# Patient Record
Sex: FEMALE | Race: WHITE | NOT HISPANIC OR LATINO | Employment: PART TIME | URBAN - METROPOLITAN AREA
[De-identification: names, ages, dates, MRNs, and addresses within clinical notes are randomized per-mention and may not be internally consistent; named-entity substitution may affect disease eponyms.]

---

## 2017-01-26 ENCOUNTER — ALLSCRIPTS OFFICE VISIT (OUTPATIENT)
Dept: OTHER | Facility: OTHER | Age: 27
End: 2017-01-26

## 2017-03-17 ENCOUNTER — ALLSCRIPTS OFFICE VISIT (OUTPATIENT)
Dept: OTHER | Facility: OTHER | Age: 27
End: 2017-03-17

## 2017-08-11 ENCOUNTER — HOSPITAL ENCOUNTER (OUTPATIENT)
Dept: RADIOLOGY | Facility: HOSPITAL | Age: 27
Discharge: HOME | End: 2017-08-11
Payer: COMMERCIAL

## 2017-08-11 ENCOUNTER — ALLSCRIPTS OFFICE VISIT (OUTPATIENT)
Dept: OTHER | Facility: OTHER | Age: 27
End: 2017-08-11

## 2017-08-11 ENCOUNTER — TRANSCRIBE ORDERS (OUTPATIENT)
Dept: ADMINISTRATIVE | Facility: HOSPITAL | Age: 27
End: 2017-08-11

## 2017-08-11 DIAGNOSIS — R07.89 OTHER CHEST PAIN: ICD-10-CM

## 2017-08-11 PROCEDURE — 71020 HB CHEST X-RAY 2VW FRONTAL&LATL: CPT

## 2017-10-23 ENCOUNTER — ALLSCRIPTS OFFICE VISIT (OUTPATIENT)
Dept: OTHER | Facility: OTHER | Age: 27
End: 2017-10-23

## 2017-10-24 NOTE — PROGRESS NOTES
Assessment  1  Cough due to bronchospasm (519 11) (J98 01)   2  Bacterial sinusitis (473 9,041 9) (J32 9,B96 89)    Plan  Cough due to bronchospasm    · ProAir  (90 Base) MCG/ACT Inhalation Aerosol Solution; INHALE 2 PUFFS  EVERY 4 HOURS AS NEEDED    Discussion/Summary    Patient to start Amoxicillin and ProAir inhaler for chest tightness  RTO as needed  Possible side effects of new medications were reviewed with the patient/guardian today  The treatment plan was reviewed with the patient/guardian  The patient/guardian understands and agrees with the treatment plan      Chief Complaint  Cough and congestion  History of Present Illness  HPI: 32year old white female c/o cough and congestion that started last week, Wednesday night  Has no runny nose, but post nasal drip, and nausea  Review of Systems    Constitutional: fever,-- chills-- and-- feeling tired  ENT: earache-- and-- sore throat, but-- as noted in HPI  Respiratory: shortness of breath-- and-- cough  Gastrointestinal: nausea, but-- no abdominal pain-- and-- no vomiting  Active Problems  1  Allergic rhinitis, unspecified (477 9) (J30 9)   2  Cough due to bronchospasm (519 11) (J98 01)   3  Fatigue (780 79) (R53 83)   4  GERD without esophagitis (530 81) (K21 9)   5  Moderate episode of recurrent major depressive disorder (296 32) (F33 1)    Social History   · Dental care, regularly   · Employed   · Exercises rarely (V49 89) (Z78 9)   · Lives with significant other   · Living Situation: Supportive and safe   · Minimum alcohol consumption   · Never smoker   · No drug use   · Well-balanced diet    Current Meds   1  BuPROPion HCl ER (SR) 100 MG Oral Tablet Extended Release 12 Hour; TAKE 1   TABLET Other in the morning for 7 days then 1 pill twice daily; Therapy: [id number] to (Evaluate:18Oct2017)  Requested for: 20Twg7992; Last   Rx:[id number] Ordered   2  RaNITidine HCl - 150 MG Oral Tablet; TAKE 1 TABLET DAILY AS NEEDED;    Therapy: [id number] to (Evaluate:22Jan2017); Last Rx:[id number] Ordered   3  ZyrTEC Allergy 10 MG Oral Capsule; qd;   Therapy: 03Kqj2281 to (Last Rx:[id number]) Ordered    The medication list was reviewed and updated today  Allergies  1  Cephalosporins   2  Reglan    Vitals   Recorded: [id number] 02:35PM   Temperature 96 9 F, Tympanic   Systolic 385, LUE, Sitting   Diastolic 70, LUE, Sitting   Weight 118 lb    BMI Calculated 20 25   BSA Calculated 1 56     Physical Exam    Constitutional   General appearance: No acute distress, well appearing and well nourished  Eyes   Conjunctiva and lids: No swelling, erythema or discharge  Pupils and irises: Equal, round and reactive to light  Ears, Nose, Mouth, and Throat   External inspection of ears and nose: Normal     Otoscopic examination: Tympanic membranes translucent with normal light reflex  Canals patent without erythema  Nasal mucosa, septum, and turbinates: Abnormal  -- Turbinates inflamed, septum deviated  Oropharynx: Normal with no erythema, edema, exudate or lesions  Pulmonary   Respiratory effort: No increased work of breathing or signs of respiratory distress  Auscultation of lungs: Clear to auscultation           Signatures   Electronically signed by : Jh Garza [facility]; Oct 23 2017  2:44PM EST                       (Author)    Electronically signed by : Zachary Wilks DO; Oct 23 2017  5:05PM EST                       (Author)

## 2018-01-13 VITALS
HEIGHT: 64 IN | DIASTOLIC BLOOD PRESSURE: 80 MMHG | SYSTOLIC BLOOD PRESSURE: 120 MMHG | RESPIRATION RATE: 70 BRPM | TEMPERATURE: 98.1 F | BODY MASS INDEX: 21.22 KG/M2 | WEIGHT: 124.31 LBS

## 2018-01-13 VITALS
TEMPERATURE: 98.5 F | WEIGHT: 124.31 LBS | DIASTOLIC BLOOD PRESSURE: 70 MMHG | SYSTOLIC BLOOD PRESSURE: 118 MMHG | HEART RATE: 76 BPM | HEIGHT: 64 IN | BODY MASS INDEX: 21.22 KG/M2

## 2018-01-14 VITALS
BODY MASS INDEX: 20.25 KG/M2 | DIASTOLIC BLOOD PRESSURE: 70 MMHG | SYSTOLIC BLOOD PRESSURE: 110 MMHG | WEIGHT: 118 LBS | TEMPERATURE: 96.9 F

## 2018-06-06 ENCOUNTER — OFFICE VISIT (OUTPATIENT)
Dept: FAMILY MEDICINE CLINIC | Facility: HOSPITAL | Age: 28
End: 2018-06-06

## 2018-06-06 VITALS
TEMPERATURE: 96.3 F | BODY MASS INDEX: 19.99 KG/M2 | OXYGEN SATURATION: 99 % | HEART RATE: 79 BPM | DIASTOLIC BLOOD PRESSURE: 70 MMHG | SYSTOLIC BLOOD PRESSURE: 112 MMHG | WEIGHT: 112.8 LBS | HEIGHT: 63 IN

## 2018-06-06 DIAGNOSIS — J32.9 SINUSITIS, UNSPECIFIED CHRONICITY, UNSPECIFIED LOCATION: Primary | ICD-10-CM

## 2018-06-06 PROBLEM — F33.1 MODERATE EPISODE OF RECURRENT MAJOR DEPRESSIVE DISORDER (HCC): Status: ACTIVE | Noted: 2017-01-26

## 2018-06-06 PROBLEM — R53.83 FATIGUE: Status: ACTIVE | Noted: 2017-08-11

## 2018-06-06 PROBLEM — J30.9 ALLERGIC RHINITIS: Status: ACTIVE | Noted: 2017-08-11

## 2018-06-06 PROBLEM — Q51.28 SEPTATE UTERUS: Status: ACTIVE | Noted: 2017-12-07

## 2018-06-06 PROCEDURE — 99212 OFFICE O/P EST SF 10 MIN: CPT | Performed by: PHYSICIAN ASSISTANT

## 2018-06-06 RX ORDER — ALBUTEROL SULFATE 90 UG/1
1 AEROSOL, METERED RESPIRATORY (INHALATION) EVERY 6 HOURS
COMMUNITY
End: 2022-06-24 | Stop reason: ALTCHOICE

## 2018-06-06 RX ORDER — CETIRIZINE HYDROCHLORIDE 10 MG/1
10 TABLET ORAL
COMMUNITY

## 2018-06-06 RX ORDER — AZITHROMYCIN 250 MG/1
250 TABLET, FILM COATED ORAL EVERY 24 HOURS
Qty: 6 TABLET | Refills: 0 | Status: SHIPPED | OUTPATIENT
Start: 2018-06-06 | End: 2018-06-11

## 2018-06-06 RX ORDER — NORGESTIMATE AND ETHINYL ESTRADIOL
1 KIT DAILY
Refills: 4 | COMMUNITY
Start: 2018-04-09 | End: 2022-06-24 | Stop reason: ALTCHOICE

## 2018-06-06 RX ORDER — RANITIDINE 150 MG/1
1 TABLET ORAL DAILY PRN
COMMUNITY
Start: 2016-12-23 | End: 2022-06-24 | Stop reason: ALTCHOICE

## 2018-06-06 NOTE — PROGRESS NOTES
Assessment/Plan:           Diagnoses and all orders for this visit:    Sinusitis, unspecified chronicity, unspecified location  -     azithromycin (ZITHROMAX) 250 mg tablet; Take 1 tablet (250 mg total) by mouth every 24 hours for 5 days Take 2 tabs  Day one, then one tab  Daily until completed  Other orders  -     albuterol (PROVENTIL HFA,VENTOLIN HFA) 90 mcg/act inhaler; Inhale 1 puff every 6 (six) hours  -     cetirizine (ZyrTEC) 10 mg tablet; Take 10 mg by mouth  -     TRI-LO-TITUS 0 18/0 215/0 25 MG-25 MCG per tablet; Take 1 tablet by mouth daily  -     IRON PO; Take 1 tablet by mouth  -     ranitidine (ZANTAC) 150 mg tablet; Take 1 tablet by mouth daily as needed        Subjective:      Patient ID: Jones Kramer is a 32 y o  female  32year old white female presents with c/o sinus infection sx  Past one week  Was on flight from [place], with someone sick next to patient  Has congestion, green mucous  Coughing productive of green phlegm, sneezing, and having chills  Very tired  Took Mucinex with no relief  Has shortness of breath  Sinus Problem   Associated symptoms include chills, congestion, coughing, diaphoresis, ear pain, headaches, shortness of breath and a sore throat  Review of Systems   Constitutional: Positive for chills, diaphoresis and fatigue  HENT: Positive for congestion, ear pain, rhinorrhea and sore throat  Respiratory: Positive for cough, chest tightness and shortness of breath  Neurological: Positive for dizziness and headaches  Objective:      /70 (BP Location: Left arm, Patient Position: Sitting, Cuff Size: Standard)   Pulse 79   Temp (!) 96 3 °F (35 7 °C)   Ht 5' 3" (1 6 m)   Wt 51 2 kg (112 lb 12 8 oz)   SpO2 99%   BMI 19 98 kg/m²          Physical Exam   Constitutional: She is oriented to person, place, and time  She appears well-developed and well-nourished  No distress  Slim build      HENT:   Head: Normocephalic and atraumatic  Right Ear: External ear normal    Left Ear: External ear normal    Nose: Nose normal    Mouth/Throat: No oropharyngeal exudate  Pulmonary/Chest: Effort normal and breath sounds normal  No respiratory distress  She has no wheezes  She has no rales  Neurological: She is alert and oriented to person, place, and time  Skin: She is not diaphoretic  Nursing note and vitals reviewed

## 2018-06-06 NOTE — PATIENT INSTRUCTIONS
Patient to start Zithromax, given Rx  And coupon for pharmacy  Also to increase water intake, and can try otc decongestant

## 2019-03-06 ENCOUNTER — OFFICE VISIT (OUTPATIENT)
Dept: URGENT CARE | Facility: CLINIC | Age: 29
End: 2019-03-06
Payer: COMMERCIAL

## 2019-03-06 VITALS
HEART RATE: 82 BPM | OXYGEN SATURATION: 96 % | SYSTOLIC BLOOD PRESSURE: 118 MMHG | RESPIRATION RATE: 16 BRPM | BODY MASS INDEX: 21.97 KG/M2 | WEIGHT: 124 LBS | TEMPERATURE: 99 F | HEIGHT: 63 IN | DIASTOLIC BLOOD PRESSURE: 76 MMHG

## 2019-03-06 DIAGNOSIS — L50.8 URTICARIA, ACUTE: Primary | ICD-10-CM

## 2019-03-06 PROCEDURE — G0382 LEV 3 HOSP TYPE B ED VISIT: HCPCS | Performed by: FAMILY MEDICINE

## 2019-03-06 RX ORDER — METHYLPREDNISOLONE SODIUM SUCCINATE 125 MG/2ML
125 INJECTION, POWDER, LYOPHILIZED, FOR SOLUTION INTRAMUSCULAR; INTRAVENOUS ONCE
Status: COMPLETED | OUTPATIENT
Start: 2019-03-06 | End: 2019-03-06

## 2019-03-06 RX ORDER — PREDNISONE 10 MG/1
TABLET ORAL
Qty: 10 TABLET | Refills: 0 | Status: SHIPPED | OUTPATIENT
Start: 2019-03-07 | End: 2019-03-11

## 2019-03-06 RX ORDER — NORGESTIMATE AND ETHINYL ESTRADIOL 7DAYSX3 28
KIT ORAL
COMMUNITY
Start: 2019-02-11 | End: 2022-06-24 | Stop reason: ALTCHOICE

## 2019-03-06 RX ADMIN — METHYLPREDNISOLONE SODIUM SUCCINATE 125 MG: 125 INJECTION, POWDER, LYOPHILIZED, FOR SOLUTION INTRAMUSCULAR; INTRAVENOUS at 10:24

## 2019-03-06 NOTE — PATIENT INSTRUCTIONS
Solu-Medrol 125 mg given IM in care now  Prednisone taper start tomorrow  Follow-up with PCP in 4-5 days if symptoms not improving, sooner if symptoms acutely worsen

## 2019-03-06 NOTE — PROGRESS NOTES
[facility]        NAME: Joesph Joya is a 29 y o  female  : 1990    MRN: [id number]  DATE: 2019  TIME: 11:22 AM    Assessment and Plan   Urticaria, acute [L50 8]  1  Urticaria, acute  methylPREDNISolone sodium succinate (Solu-MEDROL) injection 125 mg    predniSONE 10 mg tablet         Patient Instructions   Patient Instructions   Solu-Medrol 125 mg given IM in care now  Prednisone taper start tomorrow  Follow-up with PCP in 4-5 days if symptoms not improving, sooner if symptoms acutely worsen        Proceed to  ER if symptoms worsen  Chief Complaint     Chief Complaint   Patient presents with    Rash     Pt reports an itchy rash on her hands/arms/feet that began yesterday  History of Present Illness       Patient presents with complaint of itchy rash on her arms legs feet and the worse on her hands and fingers  She states she had a viral sinus infection for the past week or so which has improved  She did take Benadryl this morning without much improvement  She denies fevers or chills, no headache no shortness of breath or wheezing, mild chest tightness from her resolving illness  No symptoms made worse by the development of this rash  No joint pain no skin exfoliation      Review of Systems   Review of Systems   Constitutional: Negative  HENT: Positive for congestion  Respiratory: Positive for chest tightness  Cardiovascular: Negative  Skin: Positive for rash           Current Medications       Current Outpatient Medications:     cetirizine (ZyrTEC) 10 mg tablet, Take 10 mg by mouth, Disp: , Rfl:     norgestimate-ethinyl estradiol (ORTHO TRI-CYCLEN,TRINESSA) 0 18/0 215/0 25 MG-35 MCG per tablet, , Disp: , Rfl:     ranitidine (ZANTAC) 150 mg tablet, Take 1 tablet by mouth daily as needed, Disp: , Rfl:     albuterol (PROVENTIL HFA,VENTOLIN HFA) 90 mcg/act inhaler, Inhale 1 puff every 6 (six) hours, Disp: , Rfl:     IRON PO, Take 1 tablet by mouth, Disp: , Rfl:     [START ON 3/7/2019] predniSONE 10 mg tablet, Take 4 tablets starting tomorrow then 3 tablets the next day, 2 tablets the next day, 1 tablet the next day, Disp: 10 tablet, Rfl: 0    TRI-LO-TITUS 0 18/0 215/0 25 MG-25 MCG per tablet, Take 1 tablet by mouth daily, Disp: , Rfl: 4  No current facility-administered medications for this visit  Current Allergies     Allergies as of 03/06/2019 - Reviewed 03/06/2019   Allergen Reaction Noted    Hydrocodone-acetaminophen Other (See Comments)     Lactose Diarrhea 01/08/2016    Metoclopramide Other (See Comments) 02/16/2015    Other GI Intolerance 02/29/2016    Oxycodone-acetaminophen      Pollen extract  12/18/2015    Cephalexin Rash 08/09/2016    Cephalosporins Rash 07/25/2016            The following portions of the patient's history were reviewed and updated as appropriate: allergies, current medications, past family history, past medical history, past social history, past surgical history and problem list      Past Medical History:   Diagnosis Date    Migraines     Miscarriage     POTS (postural orthostatic tachycardia syndrome)        History reviewed  No pertinent surgical history  History reviewed  No pertinent family history  Medications have been verified  Objective   /76   Pulse 82   Temp 99 °F (37 2 °C)   Resp 16   Ht 5' 3" (1 6 m)   Wt 56 2 kg (124 lb)   LMP 02/13/2019   SpO2 96%   BMI 21 97 kg/m²        Physical Exam     Physical Exam   Constitutional: She appears well-developed and well-nourished  HENT:   Right Ear: External ear normal    Left Ear: External ear normal    Mouth/Throat: Oropharynx is clear and moist  No oropharyngeal exudate  Mild intranasal mucosal erythema and rhinorrhea   Eyes: Pupils are equal, round, and reactive to light  Conjunctivae are normal    Neck: Neck supple  Cardiovascular: Normal rate, regular rhythm and normal heart sounds     Pulmonary/Chest: Effort normal and breath sounds normal  No respiratory distress  She has no wheezes  Lymphadenopathy:     She has no cervical adenopathy     Skin:   Few areas of macules on the upper extremities, bilateral hands with diffuse erythema and warmth extending from the metacarpal heads to the fingers easily blanchable

## 2019-11-25 ENCOUNTER — OFFICE VISIT (OUTPATIENT)
Dept: URGENT CARE | Facility: CLINIC | Age: 29
End: 2019-11-25
Payer: COMMERCIAL

## 2019-11-25 VITALS
BODY MASS INDEX: 23.21 KG/M2 | SYSTOLIC BLOOD PRESSURE: 140 MMHG | RESPIRATION RATE: 18 BRPM | TEMPERATURE: 99.2 F | OXYGEN SATURATION: 99 % | HEIGHT: 63 IN | WEIGHT: 131 LBS | HEART RATE: 97 BPM | DIASTOLIC BLOOD PRESSURE: 90 MMHG

## 2019-11-25 DIAGNOSIS — R06.00 DYSPNEA, UNSPECIFIED TYPE: ICD-10-CM

## 2019-11-25 DIAGNOSIS — J02.9 ACUTE PHARYNGITIS, UNSPECIFIED ETIOLOGY: Primary | ICD-10-CM

## 2019-11-25 LAB — S PYO AG THROAT QL: NEGATIVE

## 2019-11-25 PROCEDURE — 87880 STREP A ASSAY W/OPTIC: CPT | Performed by: FAMILY MEDICINE

## 2019-11-25 PROCEDURE — 99213 OFFICE O/P EST LOW 20 MIN: CPT | Performed by: FAMILY MEDICINE

## 2019-11-25 NOTE — PROGRESS NOTES
NAME: Ciro Lefort is a 34 y o  female  : 1990    MRN: [id number]      Assessment and Plan   Acute pharyngitis, unspecified etiology [J02 9]  1  Acute pharyngitis, unspecified etiology  POCT rapid strepA   2  Dyspnea, unspecified type             Patient Instructions   Patient Instructions   F/u as needed  Go to ER if worse symptoms  Only mild symptoms  Albuterol as needed  Rapid strep neg    Proceed to ER if symptoms worsen  Chief Complaint     Chief Complaint   Patient presents with    Sinusitis     Pt states she has had sinus pressure with post nasal drip x 3 days  Pt is 4 weeks pregnant  She states she is SOB at rest  Pulse ox 99%  History of Present Illness   Here c/o sinus congestion  Stuffy, runny nose  Postnasal drip  Sinus pressure for 3 days  Feeling SOB sitting  No hx of DVT or calf pain  No recent surgeries  No recent illness in bed for weeks  She is 4 wks pregnant  She did not use an inhaler- out of this  Maybe some tightness in chest at times      Review of Systems   Review of Systems   Constitutional: Positive for fatigue  Negative for fever  HENT: Positive for sinus pressure and sore throat  Negative for ear pain and trouble swallowing  Eyes: Negative for visual disturbance  Respiratory: Positive for cough and shortness of breath  Negative for chest tightness  Cardiovascular: Negative for chest pain  Gastrointestinal: Positive for nausea  Negative for abdominal pain, diarrhea and vomiting  Genitourinary: Negative for difficulty urinating and dysuria  Skin: Negative for rash and wound  Neurological: Positive for headaches  Negative for weakness  Psychiatric/Behavioral: Negative for behavioral problems and confusion           Current Medications       Current Outpatient Medications:     albuterol (PROVENTIL HFA,VENTOLIN HFA) 90 mcg/act inhaler, Inhale 1 puff every 6 (six) hours, Disp: , Rfl:     cetirizine (ZyrTEC) 10 mg tablet, Take 10 mg by mouth, Disp: , Rfl:     IRON PO, Take 1 tablet by mouth, Disp: , Rfl:     norgestimate-ethinyl estradiol (ORTHO TRI-CYCLEN,TRINESSA) 0 18/0 215/0 25 MG-35 MCG per tablet, , Disp: , Rfl:     ranitidine (ZANTAC) 150 mg tablet, Take 1 tablet by mouth daily as needed, Disp: , Rfl:     TRI-LO-TITUS 0 18/0 215/0 25 MG-25 MCG per tablet, Take 1 tablet by mouth daily, Disp: , Rfl: 4    Current Allergies     Allergies as of 11/25/2019 - Reviewed 11/25/2019   Allergen Reaction Noted    Hydrocodone-acetaminophen Other (See Comments)     Lactose Diarrhea 01/08/2016    Metoclopramide Other (See Comments) 02/16/2015    Other GI Intolerance 02/29/2016    Oxycodone-acetaminophen      Pollen extract Itching 12/18/2015    Tramadol Other (See Comments) 10/15/2018    Cephalexin Rash 08/09/2016    Cephalosporins Rash 07/25/2016              Past Medical History:   Diagnosis Date    Migraines     Miscarriage     POTS (postural orthostatic tachycardia syndrome)        History reviewed  No pertinent surgical history  History reviewed  No pertinent family history  Medications have been verified  The following portions of the patient's history were reviewed and updated as appropriate: allergies, current medications, past family history, past medical history, past social history, past surgical history and problem list     Objective   /90   Pulse 97   Temp 99 2 °F (37 3 °C)   Resp 18   Ht 5' 3" (1 6 m)   Wt 59 4 kg (131 lb)   SpO2 99%   BMI 23 21 kg/m²      Physical Exam     Physical Exam   Constitutional: She is oriented to person, place, and time  She appears well-developed and well-nourished  HENT:   Head: Normocephalic  Eyes: EOM are normal    Neck: Normal range of motion  Cardiovascular: Normal rate, regular rhythm and normal heart sounds  Exam reveals no gallop and no friction rub  No murmur heard  Pulmonary/Chest: Effort normal and breath sounds normal  No respiratory distress   She has no wheezes  She has no rales  Abdominal: Soft  Bowel sounds are normal  She exhibits no distension  There is no tenderness  There is no rebound and no guarding  Musculoskeletal: Normal range of motion  Neurological: She is oriented to person, place, and time  Skin: Skin is warm and dry  No rash noted  Psychiatric: She has a normal mood and affect  Thought content normal    Nursing note and vitals reviewed

## 2019-11-25 NOTE — PATIENT INSTRUCTIONS
F/u as needed  Go to ER if worse symptoms  Only mild symptoms  Albuterol as needed    Rapid strep neg

## 2019-12-23 ENCOUNTER — OFFICE VISIT (OUTPATIENT)
Dept: URGENT CARE | Facility: CLINIC | Age: 29
End: 2019-12-23
Payer: COMMERCIAL

## 2019-12-23 VITALS
OXYGEN SATURATION: 98 % | DIASTOLIC BLOOD PRESSURE: 69 MMHG | SYSTOLIC BLOOD PRESSURE: 131 MMHG | HEART RATE: 92 BPM | RESPIRATION RATE: 20 BRPM | BODY MASS INDEX: 21.34 KG/M2 | WEIGHT: 125 LBS | TEMPERATURE: 99.9 F | HEIGHT: 64 IN

## 2019-12-23 DIAGNOSIS — H10.9 BACTERIAL CONJUNCTIVITIS OF RIGHT EYE: Primary | ICD-10-CM

## 2019-12-23 PROCEDURE — 99213 OFFICE O/P EST LOW 20 MIN: CPT | Performed by: FAMILY MEDICINE

## 2019-12-23 RX ORDER — SULFACETAMIDE SODIUM 100 MG/ML
1 SOLUTION/ DROPS OPHTHALMIC
Qty: 5 ML | Refills: 0 | Status: SHIPPED | OUTPATIENT
Start: 2019-12-23 | End: 2019-12-30

## 2019-12-23 NOTE — PATIENT INSTRUCTIONS
F/u here as needed  Stop wearing contacts for 1 wk  Begin abx eye drops  F/u with Eye dr if no improvement

## 2019-12-23 NOTE — PROGRESS NOTES
NAME: Kannan Snyder is a 34 y o  female  : 1990    MRN: [id number]      Assessment and Plan   Bacterial conjunctivitis of right eye [H10 9]  1  Bacterial conjunctivitis of right eye  sulfacetamide (BLEPH-10) 10 % ophthalmic solution           Patient Instructions   Patient Instructions   F/u here as needed  Stop wearing contacts for 1 wk  Begin abx eye drops  F/u with Eye dr if no improvement  Proceed to ER if symptoms worsen  Chief Complaint     Chief Complaint   Patient presents with    Eye Redness     patient reports today started with right eye redness, itching  and burning along with irritation  reports she is 8 weeks pregnant  History of Present Illness   Here c/o R eye redness  No change in vision  Started today  Wears contacts  Minor pain,itches, burns  Took out contacts      Review of Systems   Review of Systems   Constitutional: Negative for fatigue and fever  HENT: Negative for ear pain and trouble swallowing  Eyes: Positive for pain, discharge and redness  Negative for visual disturbance  Respiratory: Negative for chest tightness and shortness of breath  Cardiovascular: Negative for chest pain  Gastrointestinal: Negative for abdominal pain, diarrhea, nausea and vomiting  Genitourinary: Negative for difficulty urinating and dysuria  Skin: Negative for rash and wound  Neurological: Negative for weakness and headaches           Current Medications       Current Outpatient Medications:     cetirizine (ZyrTEC) 10 mg tablet, Take 10 mg by mouth, Disp: , Rfl:     doxylamine (UNISON) 25 MG tablet, Take 25 mg by mouth daily as needed, Disp: , Rfl:     IRON PO, Take 1 tablet by mouth, Disp: , Rfl:     Prenatal Multivit-Min-Fe-FA (PRENATAL 1 + IRON PO), Take 1 tablet by mouth daily, Disp: , Rfl:     albuterol (PROVENTIL HFA,VENTOLIN HFA) 90 mcg/act inhaler, Inhale 1 puff every 6 (six) hours, Disp: , Rfl:     norgestimate-ethinyl estradiol (ORTHO TRI-CYCLEN,TRINESSA) 0 18/0 215/0 25 MG-35 MCG per tablet, , Disp: , Rfl:     ranitidine (ZANTAC) 150 mg tablet, Take 1 tablet by mouth daily as needed, Disp: , Rfl:     sulfacetamide (BLEPH-10) 10 % ophthalmic solution, Administer 1 drop to the right eye every 3 (three) hours for 7 days, Disp: 5 mL, Rfl: 0    TRI-LO-TITUS 0 18/0 215/0 25 MG-25 MCG per tablet, Take 1 tablet by mouth daily, Disp: , Rfl: 4    Current Allergies     Allergies as of 12/23/2019 - Reviewed 12/23/2019   Allergen Reaction Noted    Hydrocodone-acetaminophen Other (See Comments)     Lactose Diarrhea 01/08/2016    Metoclopramide Other (See Comments) 02/16/2015    Other GI Intolerance 02/29/2016    Oxycodone-acetaminophen      Pollen extract Itching 12/18/2015    Tramadol Other (See Comments) 10/15/2018    Cephalexin Rash 08/09/2016    Cephalosporins Rash 07/25/2016              Past Medical History:   Diagnosis Date    Migraines     Miscarriage     POTS (postural orthostatic tachycardia syndrome)        History reviewed  No pertinent surgical history  History reviewed  No pertinent family history  Medications have been verified  The following portions of the patient's history were reviewed and updated as appropriate: allergies, current medications, past family history, past medical history, past social history, past surgical history and problem list     Objective   /69   Pulse 92   Temp 99 9 °F (37 7 °C)   Resp 20   Ht 5' 4" (1 626 m)   Wt 56 7 kg (125 lb)   LMP 02/13/2019   SpO2 98%   BMI 21 46 kg/m²      Physical Exam     Physical Exam   Constitutional: She is oriented to person, place, and time  She appears well-developed and well-nourished  HENT:   Head: Normocephalic  Eyes: EOM are normal  Right conjunctiva has a hemorrhage  Neck: Normal range of motion  Cardiovascular: Normal rate, regular rhythm and normal heart sounds  Exam reveals no gallop and no friction rub     No murmur heard   Pulmonary/Chest: Effort normal and breath sounds normal  No respiratory distress  She has no wheezes  She has no rales  Abdominal: Soft  Bowel sounds are normal  She exhibits no distension  There is no tenderness  There is no rebound and no guarding  Musculoskeletal: Normal range of motion  Neurological: She is oriented to person, place, and time  Skin: Skin is warm and dry  No rash noted  Psychiatric: She has a normal mood and affect  Thought content normal    Nursing note and vitals reviewed

## 2021-03-29 DIAGNOSIS — Z23 ENCOUNTER FOR IMMUNIZATION: ICD-10-CM

## 2021-12-23 ENCOUNTER — OFFICE VISIT (OUTPATIENT)
Dept: URGENT CARE | Facility: CLINIC | Age: 31
End: 2021-12-23
Payer: COMMERCIAL

## 2021-12-23 VITALS
HEIGHT: 63 IN | BODY MASS INDEX: 24.8 KG/M2 | RESPIRATION RATE: 16 BRPM | TEMPERATURE: 98 F | WEIGHT: 140 LBS | OXYGEN SATURATION: 99 % | HEART RATE: 84 BPM

## 2021-12-23 DIAGNOSIS — J06.9 ACUTE URI: Primary | ICD-10-CM

## 2021-12-23 DIAGNOSIS — J02.9 SORE THROAT: ICD-10-CM

## 2021-12-23 LAB — S PYO AG THROAT QL: NEGATIVE

## 2021-12-23 PROCEDURE — 87070 CULTURE OTHR SPECIMN AEROBIC: CPT | Performed by: PHYSICIAN ASSISTANT

## 2021-12-23 PROCEDURE — G0382 LEV 3 HOSP TYPE B ED VISIT: HCPCS | Performed by: PHYSICIAN ASSISTANT

## 2021-12-23 PROCEDURE — 87880 STREP A ASSAY W/OPTIC: CPT | Performed by: PHYSICIAN ASSISTANT

## 2021-12-23 PROCEDURE — 87636 SARSCOV2 & INF A&B AMP PRB: CPT | Performed by: PHYSICIAN ASSISTANT

## 2021-12-23 NOTE — PATIENT INSTRUCTIONS
Upper Respiratory Infection   WHAT YOU NEED TO KNOW:   An upper respiratory infection is also called a cold  It can affect your nose, throat, ears, and sinuses  Cold symptoms are usually worst for the first 3 to 5 days  Most people get better in 7 to 14 days  You may continue to cough for 2 to 3 weeks  Colds are caused by viruses and do not get better with antibiotics  DISCHARGE INSTRUCTIONS:   Call your local emergency number (911 in the [address] [facility]) if:   · You have chest pain or trouble breathing  Return to the emergency department if:   · You have a fever over 102ºF (39ºC)  Call your doctor if:   · You have a low fever  · Your sore throat gets worse or you see white or yellow spots in your throat  · Your symptoms get worse after 3 to 5 days or are not better in 14 days  · You have a rash anywhere on your skin  · You have large, tender lumps in your neck  · You have thick, green, or yellow drainage from your nose  · You cough up thick yellow, green, or bloody mucus  · You have a bad earache  · You have questions or concerns about your condition or care  Medicines: You may need any of the following:  · Decongestants  help reduce nasal congestion and help you breathe more easily  If you take decongestant pills, they may make you feel restless or cause problems with your sleep  Do not use decongestant sprays for more than a few days  · Cough suppressants  help reduce coughing  Ask your healthcare provider which type of cough medicine is best for you  · NSAIDs , such as ibuprofen, help decrease swelling, pain, and fever  NSAIDs can cause stomach bleeding or kidney problems in certain people  If you take blood thinner medicine, always ask your healthcare provider if NSAIDs are safe for you  Always read the medicine label and follow directions  · Acetaminophen  decreases pain and fever  It is available without a doctor's order  Ask how much to take and how often to take it  Follow directions  Read the labels of all other medicines you are using to see if they also contain acetaminophen, or ask your doctor or pharmacist  Acetaminophen can cause liver damage if not taken correctly  Do not use more than 4 grams (4,000 milligrams) total of acetaminophen in one day  · Take your medicine as directed  Contact your healthcare provider if you think your medicine is not helping or if you have side effects  Tell him or her if you are allergic to any medicine  Keep a list of the medicines, vitamins, and herbs you take  Include the amounts, and when and why you take them  Bring the list or the pill bottles to follow-up visits  Carry your medicine list with you in case of an emergency  Self-care:   · Rest as much as possible  Slowly start to do more each day  · Drink more liquids as directed  Liquids will help thin and loosen mucus so you can cough it up  Liquids will also help prevent dehydration  Liquids that help prevent dehydration include water, fruit juice, and broth  Do not drink liquids that contain caffeine  Caffeine can increase your risk for dehydration  Ask your healthcare provider how much liquid to drink each day  · Soothe a sore throat  Gargle with warm salt water  Make salt water by dissolving ¼ teaspoon salt in 1 cup warm water  You may also suck on hard candy or throat lozenges  You may use a sore throat spray  · Use a humidifier or vaporizer  Use a cool mist humidifier or a vaporizer to increase air moisture in your home  This may make it easier for you to breathe and help decrease your cough  · Use saline nasal drops as directed  These help relieve congestion  · Apply petroleum-based jelly around the outside of your nostrils  This can decrease irritation from blowing your nose  · Do not smoke  Nicotine and other chemicals in cigarettes and cigars can make your symptoms worse  They can also cause infections such as bronchitis or pneumonia   Ask your healthcare provider for information if you currently smoke and need help to quit  E-cigarettes or smokeless tobacco still contain nicotine  Talk to your healthcare provider before you use these products  Prevent a cold:   · Wash your hands often  Use soap and water every time you wash your hands  Rub your soapy hands together, lacing your fingers  Use the fingers of one hand to scrub under the nails of the other hand  Wash for at least 20 seconds  Rinse with warm, running water for several seconds  Then dry your hands  Use germ-killing gel if soap and water are not available  Do not touch your eyes or mouth without washing your hands first          · Cover a sneeze or cough  Use a tissue that covers your mouth and nose  Put the used tissue in the trash right away  Use the bend of your arm if a tissue is not available  Wash your hands well with soap and water or use a hand   Do not stand close to anyone who is sneezing or coughing  · Try to stay away from others while you are sick  This is especially important during the first 2 to 3 days when the virus is more easily spread  Wait until a fever, cough, or other symptoms are gone before you return to work or other regular activities  · Do not share items while you are sick  This includes food, drinks, eating utensils, and dishes  Follow up with your doctor as directed:  Write down your questions so you remember to ask them during your visits  © Copyright [employer] 2021 Information is for End User's use only and may not be sold, redistributed or otherwise used for commercial purposes  All illustrations and images included in CareNotes® are the copyrighted property of A D A M [employer] Inc  or [employer]  The above information is an  only  It is not intended as medical advice for individual conditions or treatments   Talk to your doctor, nurse or pharmacist before following any medical regimen to see if it is safe and effective for you

## 2021-12-23 NOTE — LETTER
December 23, 2021     Patient: Jed Jimenez   YOB: 1990   Date of Visit: 12/23/2021       To Whom it May Concern:    Anatoly Pagan was seen in my clinic on 12/23/2021  She may return to work provide she has a negative COVID test and is fever free for at least 24 hours  If you have any questions or concerns, please don't hesitate to call           Sincerely,          Jocelyne Magallon PA-C        CC: Jed Jimenez

## 2021-12-25 LAB — BACTERIA THROAT CULT: NORMAL

## 2021-12-26 LAB
FLUAV RNA RESP QL NAA+PROBE: NEGATIVE
FLUBV RNA RESP QL NAA+PROBE: NEGATIVE
SARS-COV-2 RNA RESP QL NAA+PROBE: NEGATIVE

## 2022-06-24 ENCOUNTER — OFFICE VISIT (OUTPATIENT)
Dept: FAMILY MEDICINE CLINIC | Facility: HOSPITAL | Age: 32
End: 2022-06-24
Payer: COMMERCIAL

## 2022-06-24 VITALS
BODY MASS INDEX: 24.45 KG/M2 | TEMPERATURE: 97.3 F | WEIGHT: 138 LBS | DIASTOLIC BLOOD PRESSURE: 78 MMHG | OXYGEN SATURATION: 100 % | SYSTOLIC BLOOD PRESSURE: 110 MMHG | HEART RATE: 79 BPM | HEIGHT: 63 IN

## 2022-06-24 DIAGNOSIS — R53.82 CHRONIC FATIGUE: ICD-10-CM

## 2022-06-24 DIAGNOSIS — Z11.59 NEED FOR HEPATITIS C SCREENING TEST: ICD-10-CM

## 2022-06-24 DIAGNOSIS — Z13.220 NEED FOR LIPID SCREENING: ICD-10-CM

## 2022-06-24 DIAGNOSIS — J06.9 ACUTE UPPER RESPIRATORY INFECTION: Primary | ICD-10-CM

## 2022-06-24 DIAGNOSIS — Z11.4 ENCOUNTER FOR SCREENING FOR HIV: ICD-10-CM

## 2022-06-24 PROCEDURE — 3008F BODY MASS INDEX DOCD: CPT | Performed by: NURSE PRACTITIONER

## 2022-06-24 PROCEDURE — 99213 OFFICE O/P EST LOW 20 MIN: CPT | Performed by: NURSE PRACTITIONER

## 2022-06-24 PROCEDURE — 3725F SCREEN DEPRESSION PERFORMED: CPT | Performed by: NURSE PRACTITIONER

## 2022-06-24 PROCEDURE — 1036F TOBACCO NON-USER: CPT | Performed by: NURSE PRACTITIONER

## 2022-06-24 RX ORDER — PREDNISONE 10 MG/1
TABLET ORAL
Qty: 16 TABLET | Refills: 0 | Status: SHIPPED | OUTPATIENT
Start: 2022-06-24 | End: 2022-07-26 | Stop reason: ALTCHOICE

## 2022-06-24 NOTE — PROGRESS NOTES
Assessment/Plan:    No problem-specific Assessment & Plan notes found for this encounter  Diagnoses and all orders for this visit:    Acute upper respiratory infection  Comments:  Likely viral given s/s & no improvement w/amox  Prednisone taper RX'd  Continue sudafed/mucinex, OTC cough syrup and tylenol/motrin  Increase hydration  Orders:  -     predniSONE 10 mg tablet; Take 4 tablets today then 3 tablets for 2 days, 2 tablets for 2 days, and 1 tablet for 2 days    Need for hepatitis C screening test  Comments:  agreeable to screening  Orders:  -     Hepatitis C Ab W/Refl To HCV RNA, Qn, PCR; Future  -     Hepatitis C Ab W/Refl To HCV RNA, Qn, PCR    Encounter for screening for HIV  Comments:  agreeable to screening  Orders:  -     Human Immunodeficiency Virus 1/2 Antigen / Antibody ( Fourth Generation) with Reflex Testing; Future  -     Human Immunodeficiency Virus 1/2 Antigen / Antibody ( Fourth Generation) with Reflex Testing    Chronic fatigue  Comments:  Update fasting lab work per pt request, instructed to wait 2 weeks after completing prednisone taper  F/u in about 4-6 weeks to update physical    Orders:  -     CBC and differential; Future  -     Comprehensive metabolic panel; Future  -     TSH, 3rd generation with Free T4 reflex; Future  -     CBC and differential  -     Comprehensive metabolic panel  -     TSH, 3rd generation with Free T4 reflex    Need for lipid screening  -     Lipid panel; Future  -     Lipid panel      Return next month to update PE (appt scheduled for 7/26)    Subjective:      Patient ID: Fabrizio Lopes is a 32 y o  female  Post nasal drip, congestion, sore throat, cough X 10 days  Started amoxicillin 6/18 (on day 4 of symptoms)--stopped yesterday AM, was causing her diarrhea  Did not feel that her URI symptoms were improving w/ antibiotic  Taking sudafed, mucinex, tylenol/motrin as needed with relief     Reports this is the 3rd month in a row that she has had the same symptoms before her menstrual cycle has started  Last URI was about 10 yrs ago  2 home COVID tests were negative on day 4 of symptoms  Had 2 COVID vaccines 4/2021 and flu vaccine 11/2021  Denies sick contacts, but reports her son has had a dry cough and allergies for the last month  Has hx of mast cell disease, was told many years ago her thyroid levels were borderline  Is interested in updating lab work to check  States her body has been "out of whack" since having daughter  The following portions of the patient's history were reviewed and updated as appropriate: allergies, current medications, past family history, past medical history, past social history, past surgical history and problem list     Review of Systems   Constitutional: Positive for chills  Negative for fever  HENT: Positive for congestion, ear pain (b/l ear fullness), postnasal drip, rhinorrhea, sinus pressure, sneezing, sore throat, trouble swallowing and voice change (hoarse voice)  Eyes: Negative  Respiratory: Positive for cough (X 10 days, reports coughing up white/green sputum)  Negative for chest tightness, shortness of breath and wheezing  Cardiovascular: Negative  Gastrointestinal: Positive for diarrhea (since taking amoxicillin, last episode yesterday)  Negative for abdominal pain, constipation, nausea and vomiting  Genitourinary: Negative  Musculoskeletal: Negative  Skin: Negative  Allergic/Immunologic: Positive for environmental allergies  Neurological: Positive for headaches (relief w/ tylenol/motrin)  Negative for dizziness, syncope, weakness and light-headedness  Objective:      /78 (Patient Position: Sitting, Cuff Size: Standard)   Pulse 79   Temp (!) 97 3 °F (36 3 °C) (Tympanic)   Ht 5' 3" (1 6 m)   Wt 62 6 kg (138 lb)   SpO2 100%   BMI 24 45 kg/m²          Physical Exam  Vitals reviewed  Constitutional:       General: She is not in acute distress       Appearance: Normal appearance  She is normal weight  She is not ill-appearing or diaphoretic  HENT:      Head: Normocephalic and atraumatic  Right Ear: Tympanic membrane, ear canal and external ear normal  There is no impacted cerumen  Left Ear: Tympanic membrane, ear canal and external ear normal  There is no impacted cerumen  Nose: Congestion present  No rhinorrhea  Mouth/Throat:      Mouth: Mucous membranes are moist       Pharynx: Oropharynx is clear  Posterior oropharyngeal erythema (mild posterior oropharynx erythema) present  No oropharyngeal exudate  Eyes:      General: No scleral icterus  Conjunctiva/sclera: Conjunctivae normal       Pupils: Pupils are equal, round, and reactive to light  Neck:      Thyroid: No thyromegaly  Cardiovascular:      Rate and Rhythm: Normal rate and regular rhythm  Pulses: Normal pulses  Heart sounds: No murmur heard  Pulmonary:      Effort: Pulmonary effort is normal  No respiratory distress  Breath sounds: Normal breath sounds  No wheezing  Chest:      Chest wall: No tenderness  Musculoskeletal:      Cervical back: Normal range of motion and neck supple  Lymphadenopathy:      Cervical: No cervical adenopathy  Skin:     General: Skin is warm and dry  Capillary Refill: Capillary refill takes less than 2 seconds  Neurological:      General: No focal deficit present  Mental Status: She is alert and oriented to person, place, and time  Mental status is at baseline  Psychiatric:         Mood and Affect: Mood normal          Behavior: Behavior normal          Thought Content:  Thought content normal          Judgment: Judgment normal

## 2022-07-19 LAB
ALBUMIN SERPL-MCNC: 4.3 G/DL (ref 3.6–5.1)
ALBUMIN/GLOB SERPL: 1.7 (CALC) (ref 1–2.5)
ALP SERPL-CCNC: 87 U/L (ref 31–125)
ALT SERPL-CCNC: 15 U/L (ref 6–29)
AST SERPL-CCNC: 11 U/L (ref 10–30)
BASOPHILS # BLD AUTO: 92 CELLS/UL (ref 0–200)
BASOPHILS NFR BLD AUTO: 1.5 %
BILIRUB SERPL-MCNC: 0.3 MG/DL (ref 0.2–1.2)
BUN SERPL-MCNC: 9 MG/DL (ref 7–25)
BUN/CREAT SERPL: NORMAL (CALC) (ref 6–22)
CALCIUM SERPL-MCNC: 9.1 MG/DL (ref 8.6–10.2)
CHLORIDE SERPL-SCNC: 106 MMOL/L (ref 98–110)
CHOLEST SERPL-MCNC: 145 MG/DL
CHOLEST/HDLC SERPL: 3.2 (CALC)
CO2 SERPL-SCNC: 28 MMOL/L (ref 20–32)
CREAT SERPL-MCNC: 0.74 MG/DL (ref 0.5–0.97)
EOSINOPHIL # BLD AUTO: 189 CELLS/UL (ref 15–500)
EOSINOPHIL NFR BLD AUTO: 3.1 %
ERYTHROCYTE [DISTWIDTH] IN BLOOD BY AUTOMATED COUNT: 12.1 % (ref 11–15)
GFR/BSA.PRED SERPLBLD CYS-BASED-ARV: 111 ML/MIN/1.73M2
GLOBULIN SER CALC-MCNC: 2.5 G/DL (CALC) (ref 1.9–3.7)
GLUCOSE SERPL-MCNC: 86 MG/DL (ref 65–99)
HCT VFR BLD AUTO: 40.4 % (ref 35–45)
HCV AB S/CO SERPL IA: 0.08
HCV AB SERPL QL IA: NORMAL
HDLC SERPL-MCNC: 45 MG/DL
HGB BLD-MCNC: 13.5 G/DL (ref 11.7–15.5)
HIV 1+2 AB+HIV1 P24 AG SERPL QL IA: NORMAL
LDLC SERPL CALC-MCNC: 81 MG/DL (CALC)
LYMPHOCYTES # BLD AUTO: 2776 CELLS/UL (ref 850–3900)
LYMPHOCYTES NFR BLD AUTO: 45.5 %
MCH RBC QN AUTO: 30.8 PG (ref 27–33)
MCHC RBC AUTO-ENTMCNC: 33.4 G/DL (ref 32–36)
MCV RBC AUTO: 92 FL (ref 80–100)
MONOCYTES # BLD AUTO: 427 CELLS/UL (ref 200–950)
MONOCYTES NFR BLD AUTO: 7 %
NEUTROPHILS # BLD AUTO: 2617 CELLS/UL (ref 1500–7800)
NEUTROPHILS NFR BLD AUTO: 42.9 %
NONHDLC SERPL-MCNC: 100 MG/DL (CALC)
PLATELET # BLD AUTO: 220 THOUSAND/UL (ref 140–400)
PMV BLD REES-ECKER: 11.9 FL (ref 7.5–12.5)
POTASSIUM SERPL-SCNC: 3.9 MMOL/L (ref 3.5–5.3)
PROT SERPL-MCNC: 6.8 G/DL (ref 6.1–8.1)
RBC # BLD AUTO: 4.39 MILLION/UL (ref 3.8–5.1)
SODIUM SERPL-SCNC: 140 MMOL/L (ref 135–146)
TRIGL SERPL-MCNC: 96 MG/DL
TSH SERPL-ACNC: 1.65 MIU/L
WBC # BLD AUTO: 6.1 THOUSAND/UL (ref 3.8–10.8)

## 2022-07-26 ENCOUNTER — TELEPHONE (OUTPATIENT)
Dept: FAMILY MEDICINE CLINIC | Facility: HOSPITAL | Age: 32
End: 2022-07-26

## 2022-07-26 ENCOUNTER — OFFICE VISIT (OUTPATIENT)
Dept: FAMILY MEDICINE CLINIC | Facility: HOSPITAL | Age: 32
End: 2022-07-26
Payer: COMMERCIAL

## 2022-07-26 VITALS
SYSTOLIC BLOOD PRESSURE: 116 MMHG | HEIGHT: 63 IN | DIASTOLIC BLOOD PRESSURE: 86 MMHG | HEART RATE: 76 BPM | WEIGHT: 137 LBS | TEMPERATURE: 97.8 F | BODY MASS INDEX: 24.27 KG/M2

## 2022-07-26 DIAGNOSIS — R10.9 ACUTE LEFT FLANK PAIN: ICD-10-CM

## 2022-07-26 DIAGNOSIS — Z00.00 ANNUAL PHYSICAL EXAM: Primary | ICD-10-CM

## 2022-07-26 DIAGNOSIS — R39.9 UTI SYMPTOMS: ICD-10-CM

## 2022-07-26 DIAGNOSIS — G90.A POTS (POSTURAL ORTHOSTATIC TACHYCARDIA SYNDROME): ICD-10-CM

## 2022-07-26 PROBLEM — R53.83 FATIGUE: Status: RESOLVED | Noted: 2017-08-11 | Resolved: 2022-07-26

## 2022-07-26 LAB
SL AMB  POCT GLUCOSE, UA: NEGATIVE
SL AMB LEUKOCYTE ESTERASE,UA: NEGATIVE
SL AMB POCT BILIRUBIN,UA: NEGATIVE
SL AMB POCT BLOOD,UA: ABNORMAL
SL AMB POCT CLARITY,UA: YELLOW
SL AMB POCT COLOR,UA: ABNORMAL
SL AMB POCT KETONES,UA: NEGATIVE
SL AMB POCT NITRITE,UA: NEGATIVE
SL AMB POCT PH,UA: 7.5
SL AMB POCT SPECIFIC GRAVITY,UA: 1.01
SL AMB POCT URINE HCG: NEGATIVE
SL AMB POCT URINE PROTEIN: NEGATIVE
SL AMB POCT UROBILINOGEN: 0.2

## 2022-07-26 PROCEDURE — 81002 URINALYSIS NONAUTO W/O SCOPE: CPT | Performed by: NURSE PRACTITIONER

## 2022-07-26 PROCEDURE — 81025 URINE PREGNANCY TEST: CPT | Performed by: NURSE PRACTITIONER

## 2022-07-26 PROCEDURE — 99395 PREV VISIT EST AGE 18-39: CPT | Performed by: NURSE PRACTITIONER

## 2022-07-26 NOTE — TELEPHONE ENCOUNTER
I used the  thru [employer]  The 20% co-insurance is $444  Patient is aware and will discuss w/ spouse  She is also considering waiting a couple of days and drinking extra water to try and flush it out

## 2022-07-26 NOTE — PATIENT INSTRUCTIONS

## 2022-07-26 NOTE — TELEPHONE ENCOUNTER
Patient concerned about cost of the CT scan  She has a $2000 deductible      Asking if there is any medication she can take or just drink a lot to flush out her system before resorting to CT scan?    pcb asap

## 2022-07-26 NOTE — PROGRESS NOTES
[facility] [address]     NAME: Karuna Espinoza  AGE: 32 y o  SEX: female  : 1990     DATE: 2022     Assessment and Plan:     Problem List Items Addressed This Visit        Cardiovascular and Mediastinum    POTS (postural orthostatic tachycardia syndrome)      Other Visit Diagnoses     Annual physical exam    -  Primary    PE updated, next due in 1 year; labs UTD and reviewed in detail; update gyn exam/pap smear when due    UTI symptoms        urine dip negative, will send for cx for further eval, increase water intake    Relevant Orders    POCT urine dip (Completed)    Urine culture    POCT urine HCG (Completed)    Acute left flank pain        r/o renal colic w/STAT renal CT scan today - will determine further plan pending result    Relevant Orders    CT renal stone study abdomen pelvis wo contrast        Immunizations and preventive care screenings were discussed with patient today  Appropriate education was printed on patient's after visit summary  Counseling:  Alcohol/drug use: discussed moderation in alcohol intake, the recommendations for healthy alcohol use, and avoidance of illicit drug use  Dental Health: discussed importance of regular tooth brushing, flossing, and dental visits  Sexual health: discussed sexually transmitted diseases, partner selection, use of condoms, avoidance of unintended pregnancy, and contraceptive alternatives  · Exercise: the importance of regular exercise/physical activity was discussed  Recommend exercise 3-5 times per week for at least 30 minutes  Return in about 1 year (around 2023) for Annual physical      Chief Complaint:     Chief Complaint   Patient presents with    Physical Exam    uti symptoms      History of Present Illness:     Adult Annual Physical   Patient here for a comprehensive physical exam  The patient reports problems - as per ROS      Diet and Physical Activity  · Diet/Nutrition: well balanced diet and limited junk food  · Exercise: moderate cardiovascular exercise  Depression Screening  PHQ-2/9 Depression Screening         General Health  · Sleep: sleeps well  · Hearing: normal - bilateral   · Vision: goes for regular eye exams and wears contacts  · Dental: regular dental visits  /GYN Health  · Last menstrual period: 7/15/2022  · Contraceptive method: withdrawal method  2 children (10and 3years old)       Review of Systems:     Review of Systems   Constitutional: Negative  HENT: Negative  Eyes: Negative  Respiratory: Negative  Cardiovascular: Negative  Gastrointestinal: Positive for constipation  Negative for blood in stool  Genitourinary: Positive for flank pain (on/off x2 days, feels like previous kidney stone she had 2 days ago) and frequency  Negative for dysuria and hematuria  Musculoskeletal: Positive for back pain (left flank)  Skin: Negative  Neurological: Negative  Psychiatric/Behavioral: Negative  Past Medical History:     Past Medical History:   Diagnosis Date    Abnormal echocardiogram 2/1/2016    Overview:  Prior abn Echo 2008  Found workup for POTS by Dr Palmer Cleaves  Records requested  Last Assessment & Plan:  We do not have records of abn Echo  Pt was not able to obtain records from her mother at our last visit  At our last visit pt was asymptomatic and reported 'It was my mother being nervous" No sure why pt scheduled Echo after last visit  Called pt to ask about any new S/S  Peer to Peer w    Encounter for initial prescription of contraceptive pills 8/9/2016    Fatigue 8/11/2017    Migraines     Miscarriage     POTS (postural orthostatic tachycardia syndrome)       Past Surgical History:     History reviewed  No pertinent surgical history     Social History:     Social History     Socioeconomic History    Marital status: Single     Spouse name: None    Number of children: None    Years of education: None    Highest education level: None   Occupational History    None   Tobacco Use    Smoking status: Never Smoker    Smokeless tobacco: Never Used   Vaping Use    Vaping Use: Never used   Substance and Sexual Activity    Alcohol use: Not Currently    Drug use: Never    Sexual activity: None   Other Topics Concern    None   Social History Narrative    None     Social Determinants of Health     Financial Resource Strain: Not on file   Food Insecurity: Not on file   Transportation Needs: Not on file   Physical Activity: Not on file   Stress: Not on file   Social Connections: Not on file   Intimate Partner Violence: Not on file   Housing Stability: Not on file      Family History:     Family History   Problem Relation Age of Onset    COPD Mother     Coronary artery disease Mother     Cancer Father         bladder -  at age 47    Alcohol abuse Sister     Seizures Sister     Narcolepsy Sister     Coronary artery disease Brother     Gout Brother     Alcohol abuse Brother       Current Medications:     Current Outpatient Medications   Medication Sig Dispense Refill    cetirizine (ZyrTEC) 10 mg tablet Take 10 mg by mouth       No current facility-administered medications for this visit  Allergies:      Allergies   Allergen Reactions    Hydrocodone-Acetaminophen Other (See Comments)     Vomiting    Lactose - Food Allergy Diarrhea    Metoclopramide Other (See Comments)     Tachycardia and felt "crawling out of skin " pt states  Tachycardia and felt "crawling out of skin " pt states    Other GI Intolerance     Histamine reaction    Oxycodone-Acetaminophen      vomitting    Pollen Extract Itching     Headaches and sneezing  Headaches and sneezing    Tramadol Other (See Comments)     Patient c/o of visual changes/disturbances    Cephalexin Rash    Cephalosporins Rash      Physical Exam:     /86   Pulse 76   Temp 97 8 °F (36 6 °C) (Tympanic)   Ht 5' 3" (1 6 m) Wt 62 1 kg (137 lb)   BMI 24 27 kg/m²     Physical Exam  Vitals reviewed  Constitutional:       General: She is not in acute distress  Appearance: Normal appearance  HENT:      Head: Normocephalic  Right Ear: Tympanic membrane normal       Left Ear: Tympanic membrane normal       Nose: Nose normal       Mouth/Throat:      Mouth: Mucous membranes are moist       Pharynx: Oropharynx is clear  Eyes:      General: No scleral icterus  Neck:      Thyroid: No thyromegaly  Cardiovascular:      Rate and Rhythm: Normal rate and regular rhythm  Heart sounds: No murmur heard  Pulmonary:      Effort: Pulmonary effort is normal  No respiratory distress  Breath sounds: Normal breath sounds  Abdominal:      General: Abdomen is flat  Bowel sounds are normal       Palpations: Abdomen is soft  Tenderness: There is no abdominal tenderness  There is no guarding or rebound  Musculoskeletal:         General: Normal range of motion  Cervical back: Normal range of motion  Right lower leg: No edema  Left lower leg: No edema  Lymphadenopathy:      Cervical: No cervical adenopathy  Skin:     General: Skin is warm and dry  Neurological:      General: No focal deficit present  Mental Status: She is alert and oriented to person, place, and time  Psychiatric:         Mood and Affect: Mood normal          Behavior: Behavior normal          Thought Content:  Thought content normal          Judgment: Judgment normal           ADDY Norton   [id number]  [id number]

## 2022-08-08 ENCOUNTER — TELEPHONE (OUTPATIENT)
Dept: FAMILY MEDICINE CLINIC | Facility: HOSPITAL | Age: 32
End: 2022-08-08

## 2022-08-08 DIAGNOSIS — R05.9 COUGH: Primary | ICD-10-CM

## 2022-08-08 RX ORDER — BENZONATATE 200 MG/1
200 CAPSULE ORAL 3 TIMES DAILY PRN
Qty: 20 CAPSULE | Refills: 0 | Status: SHIPPED | OUTPATIENT
Start: 2022-08-08 | End: 2022-09-19 | Stop reason: ALTCHOICE

## 2022-08-08 NOTE — TELEPHONE ENCOUNTER
Please get more details  I'm not sure what she is asking for exactly  This may be something she should address with her gyn

## 2022-08-08 NOTE — TELEPHONE ENCOUNTER
SHE ALWAYS GETS SICK AROUND HER PERIODS  SHE TALKED WITH QING ABOUT THIS, WOULD LIKE SOMETHING CALLED IN

## 2022-09-19 ENCOUNTER — OFFICE VISIT (OUTPATIENT)
Dept: FAMILY MEDICINE CLINIC | Facility: HOSPITAL | Age: 32
End: 2022-09-19
Payer: COMMERCIAL

## 2022-09-19 VITALS
SYSTOLIC BLOOD PRESSURE: 108 MMHG | HEART RATE: 91 BPM | BODY MASS INDEX: 24.17 KG/M2 | DIASTOLIC BLOOD PRESSURE: 68 MMHG | TEMPERATURE: 98.6 F | WEIGHT: 136.4 LBS | HEIGHT: 63 IN | OXYGEN SATURATION: 99 %

## 2022-09-19 DIAGNOSIS — J02.9 PHARYNGITIS, UNSPECIFIED ETIOLOGY: ICD-10-CM

## 2022-09-19 DIAGNOSIS — R39.9 URINARY SYMPTOM OR SIGN: ICD-10-CM

## 2022-09-19 DIAGNOSIS — G90.A POTS (POSTURAL ORTHOSTATIC TACHYCARDIA SYNDROME): ICD-10-CM

## 2022-09-19 DIAGNOSIS — R21 RASH OF NECK: Primary | ICD-10-CM

## 2022-09-19 DIAGNOSIS — L23.9 CONTACT ALLERGIC REACTION: ICD-10-CM

## 2022-09-19 DIAGNOSIS — D47.09 MAST CELL DISEASE: ICD-10-CM

## 2022-09-19 PROCEDURE — 99214 OFFICE O/P EST MOD 30 MIN: CPT | Performed by: INTERNAL MEDICINE

## 2022-09-19 PROCEDURE — U0003 INFECTIOUS AGENT DETECTION BY NUCLEIC ACID (DNA OR RNA); SEVERE ACUTE RESPIRATORY SYNDROME CORONAVIRUS 2 (SARS-COV-2) (CORONAVIRUS DISEASE [COVID-19]), AMPLIFIED PROBE TECHNIQUE, MAKING USE OF HIGH THROUGHPUT TECHNOLOGIES AS DESCRIBED BY CMS-2020-01-R: HCPCS | Performed by: INTERNAL MEDICINE

## 2022-09-19 PROCEDURE — U0005 INFEC AGEN DETEC AMPLI PROBE: HCPCS | Performed by: INTERNAL MEDICINE

## 2022-09-19 RX ORDER — PREDNISONE 10 MG/1
TABLET ORAL
Qty: 18 TABLET | Refills: 0 | Status: SHIPPED | OUTPATIENT
Start: 2022-09-19

## 2022-09-19 NOTE — PROGRESS NOTES
Assessment/Plan:             Problem List Items Addressed This Visit        Cardiovascular and Mediastinum    POTS (postural orthostatic tachycardia syndrome)       Other    Mast cell disease    Relevant Medications    predniSONE 10 mg tablet    Other Relevant Orders    CBC and differential      Other Visit Diagnoses     Rash of neck    -  Primary    Relevant Orders    Lipid Panel with Direct LDL reflex    CBC and differential    Comprehensive metabolic panel    Contact allergic reaction        Relevant Medications    predniSONE 10 mg tablet    Other Relevant Orders    Lipid Panel with Direct LDL reflex    CBC and differential    Comprehensive metabolic panel    Urinary symptom or sign        Relevant Orders    Ambulatory referral to Urology            Subjective:      Patient ID: Angie Sutherland is a 32 y o  female    Was at sons game on Saturday and had 3 welts on left side of neck and then has spreas in past 49 hours to right side of neck  And on upper arms has  Right upper arm linear spot    has 2 dogs that are taken outside  Has had bug bite reactions in past    had felt chilled- no fever on temp checks  fellt  Some right sided throat discomfort and some fullness on right lymoh node in region   had hx of cat allergies with desenitization as a child      The following portions of the patient's history were reviewed and updated as appropriate: allergies, current medications and problem list      Review of Systems   Constitutional: Negative for fever  Feels weird- not loike she is going to pass out in past 2 days   Respiratory: Negative for chest tightness and shortness of breath  Cardiovascular: Negative for chest pain  Skin: Positive for rash  Had rash after childbirth- had been given prednisone taper- occurred after let down of milk   - had c sections-did not discuss with anestheias         Objective:      Current Outpatient Medications:     cetirizine (ZyrTEC) 10 mg tablet, Take 10 mg by mouth, Disp: , Rfl:     predniSONE 10 mg tablet, 30 mg by mouth daily for 3 days, then 20 mg by mouth daily for 3 days, then 10 mg by mouth daily for 3 days, then stop, Disp: 18 tablet, Rfl: 0    Blood pressure 108/68, pulse 91, temperature 98 6 °F (37 °C), height 5' 3" (1 6 m), weight 61 9 kg (136 lb 6 4 oz), SpO2 99 %, unknown if currently breastfeeding  Physical Exam  Vitals and nursing note reviewed  Constitutional:       General: She is not in acute distress  HENT:      Head: Normocephalic and atraumatic  Right Ear: Tympanic membrane normal  There is no impacted cerumen  Left Ear: Tympanic membrane normal  There is no impacted cerumen  Eyes:      General:         Right eye: No discharge  Left eye: No discharge  Cardiovascular:      Rate and Rhythm: Normal rate and regular rhythm  Abdominal:      General: There is no distension  Palpations: Abdomen is soft  Tenderness: There is no abdominal tenderness  Lymphadenopathy:      Cervical: Cervical adenopathy present  Skin:     Findings: Rash present  Comments: [employer]  Rash on left side of neck and small lesion on right upper arm   Neurological:      Mental Status: She is alert

## 2022-09-20 LAB — SARS-COV-2 RNA RESP QL NAA+PROBE: NEGATIVE

## 2023-09-11 ENCOUNTER — OFFICE VISIT (OUTPATIENT)
Dept: FAMILY MEDICINE CLINIC | Facility: HOSPITAL | Age: 33
End: 2023-09-11
Payer: COMMERCIAL

## 2023-09-11 VITALS
DIASTOLIC BLOOD PRESSURE: 72 MMHG | SYSTOLIC BLOOD PRESSURE: 112 MMHG | BODY MASS INDEX: 22.32 KG/M2 | HEIGHT: 63 IN | TEMPERATURE: 98.8 F | WEIGHT: 126 LBS | HEART RATE: 79 BPM

## 2023-09-11 DIAGNOSIS — B96.89 BACTERIAL RESPIRATORY INFECTION: Primary | ICD-10-CM

## 2023-09-11 DIAGNOSIS — J98.8 BACTERIAL RESPIRATORY INFECTION: Primary | ICD-10-CM

## 2023-09-11 PROCEDURE — 99213 OFFICE O/P EST LOW 20 MIN: CPT | Performed by: NURSE PRACTITIONER

## 2023-09-11 RX ORDER — BENZONATATE 200 MG/1
200 CAPSULE ORAL 3 TIMES DAILY PRN
Qty: 20 CAPSULE | Refills: 0 | Status: SHIPPED | OUTPATIENT
Start: 2023-09-11

## 2023-09-11 RX ORDER — AMOXICILLIN AND CLAVULANATE POTASSIUM 875; 125 MG/1; MG/1
1 TABLET, FILM COATED ORAL EVERY 12 HOURS SCHEDULED
Qty: 20 TABLET | Refills: 0 | Status: SHIPPED | OUTPATIENT
Start: 2023-09-11 | End: 2023-09-21

## 2023-09-11 NOTE — PROGRESS NOTES
Name: Christine Bruno      : 1990      MRN: [id number]  Encounter Provider: ADDY Johnson  Encounter Date: 2023   Encounter department: [address]     1. Bacterial respiratory infection  -     amoxicillin-clavulanate (AUGMENTIN) 875-125 mg per tablet; Take 1 tablet by mouth every 12 (twelve) hours for 10 days  -     benzonatate (TESSALON) 200 MG capsule; Take 1 capsule (200 mg total) by mouth 3 (three) times a day as needed for cough      Will treat w/antibiotic given persistent/worse sx's. Use tessalon prn in addition  Continue to rest, push fluids, use OTC meds prn and eat diet as tolerated  Call w/worse or persistent sx's       Subjective      Has been sick on/off for 2 weeks. Symptoms have changed from congestion, sore throat, cough and fatigue. Currently continues w/bad head congestion - mucous clear or yellow. Is coughing a lot. Taking tylenol and mucinex w/o relief. Kids and  ill recently with similar. Has not tested for covid. Review of Systems   Constitutional: Positive for chills. Negative for fever. HENT: Positive for congestion, sinus pain and sore throat (on/off, worse recently). Negative for ear pain. Respiratory: Positive for cough (occ mucous) and shortness of breath ("breathing hard"). Neurological: Positive for dizziness and headaches. Current Outpatient Medications on File Prior to Visit   Medication Sig   • cetirizine (ZyrTEC) 10 mg tablet Take 10 mg by mouth   • [DISCONTINUED] predniSONE 10 mg tablet 30 mg by mouth daily for 3 days, then 20 mg by mouth daily for 3 days, then 10 mg by mouth daily for 3 days, then stop (Patient not taking: Reported on 2023)       Objective     /72   Pulse 79   Temp 98.8 °F (37.1 °C)   Ht 5' 3" (1.6 m)   Wt 57.2 kg (126 lb)   LMP 2023   BMI 22.32 kg/m²       Physical Exam  Vitals reviewed.    Constitutional:       General: She is not in acute distress. Appearance: Normal appearance. HENT:      Head: Normocephalic. Right Ear: Tympanic membrane normal.      Left Ear: Tympanic membrane normal.      Nose: Congestion present. Mouth/Throat:      Mouth: Mucous membranes are moist.      Pharynx: Oropharynx is clear. No oropharyngeal exudate. Cardiovascular:      Rate and Rhythm: Normal rate and regular rhythm. Heart sounds: No murmur heard. Pulmonary:      Effort: Pulmonary effort is normal. No respiratory distress. Breath sounds: Normal breath sounds. Comments: Frequent dry cough  Musculoskeletal:      Cervical back: Normal range of motion. Lymphadenopathy:      Cervical: No cervical adenopathy. Skin:     General: Skin is warm and dry. Neurological:      General: No focal deficit present. Mental Status: She is alert and oriented to person, place, and time.    Psychiatric:         Mood and Affect: Mood normal.         Behavior: Behavior normal.          Latia LungADDY

## 2024-11-15 ENCOUNTER — OFFICE VISIT (OUTPATIENT)
Dept: FAMILY MEDICINE CLINIC | Facility: HOSPITAL | Age: 34
End: 2024-11-15
Payer: COMMERCIAL

## 2024-11-15 VITALS
TEMPERATURE: 98 F | DIASTOLIC BLOOD PRESSURE: 68 MMHG | WEIGHT: 125 LBS | OXYGEN SATURATION: 99 % | HEIGHT: 63 IN | BODY MASS INDEX: 22.15 KG/M2 | SYSTOLIC BLOOD PRESSURE: 110 MMHG | HEART RATE: 76 BPM

## 2024-11-15 DIAGNOSIS — J01.41 ACUTE RECURRENT PANSINUSITIS: Primary | ICD-10-CM

## 2024-11-15 PROCEDURE — 99214 OFFICE O/P EST MOD 30 MIN: CPT | Performed by: INTERNAL MEDICINE

## 2024-11-15 NOTE — PROGRESS NOTES
"Assessment/Plan:     Diagnosis ICD-10-CM Associated Orders   1. Acute recurrent pansinusitis  J01.41           Problem List Items Addressed This Visit    None  Visit Diagnoses         Acute recurrent pansinusitis    -  Primary              No follow-ups on file.      Subjective:    Patient ID: Lali Montoya is a 34 y.o. female    Hx sinus surgery as teenager   Now has 2 young children - one elementary and 4 year old in     She has gone over a year without sinus infection   Has hx of mast cell disorder- stable on zyrtec regularly- had some gi  issues with diarrhea yesteray   Gets some bloddy noses on left side   2 weeks ago started with congestion and now green discolored draiange    Sinusitis  The current episode started 1 to 4 weeks ago. Associated symptoms include chills and congestion.       The following portions of the patient's history were reviewed and updated as appropriate: allergies, current medications and problem list.     Review of Systems   Constitutional:  Positive for chills. Negative for fever.   HENT:  Positive for congestion.          Objective:      Current Outpatient Medications:     cetirizine (ZyrTEC) 10 mg tablet, Take 10 mg by mouth, Disp: , Rfl:     benzonatate (TESSALON) 200 MG capsule, Take 1 capsule (200 mg total) by mouth 3 (three) times a day as needed for cough (Patient not taking: Reported on 11/15/2024), Disp: 20 capsule, Rfl: 0    Blood pressure 110/68, pulse 76, temperature 98 °F (36.7 °C), height 5' 3\" (1.6 m), weight 56.7 kg (125 lb), SpO2 99%, unknown if currently breastfeeding.     Physical Exam  Vitals reviewed.   Constitutional:       Appearance: Normal appearance. She is ill-appearing. She is not toxic-appearing.   HENT:      Head: Normocephalic.      Right Ear: Tympanic membrane normal. There is no impacted cerumen.      Ears:      Comments: Small amount air fluid left tm     Nose: Congestion present.      Mouth/Throat:      Pharynx: No oropharyngeal exudate or " posterior oropharyngeal erythema.      Comments: Some tonsilar remnants with swelling   Thick post nasal drip  Eyes:      General:         Right eye: No discharge.      Conjunctiva/sclera: Conjunctivae normal.   Cardiovascular:      Rate and Rhythm: Normal rate and regular rhythm.      Heart sounds: No murmur heard.  Pulmonary:      Breath sounds: No wheezing.      Comments: Upper airway cough  Abdominal:      General: There is no distension.      Palpations: Abdomen is soft.      Tenderness: There is no abdominal tenderness.   Musculoskeletal:      Right lower leg: No edema.      Left lower leg: No edema.   Lymphadenopathy:      Cervical: Cervical adenopathy present.   Skin:     Findings: No erythema.   Neurological:      Mental Status: She is alert.      Motor: No weakness.   Psychiatric:         Mood and Affect: Mood normal.         Thought Content: Thought content normal.

## 2025-01-27 ENCOUNTER — TELEPHONE (OUTPATIENT)
Age: 35
End: 2025-01-27

## 2025-01-27 NOTE — TELEPHONE ENCOUNTER
Pt called in requesting antibiotic for recurring sinus infection. Pt did not want to schedule appt with fear of catching something else, pt is heading on a trip to [place] in a few days. Please follow up [id number] - [place] [address]

## 2025-01-28 DIAGNOSIS — J01.41 ACUTE RECURRENT PANSINUSITIS: Primary | ICD-10-CM

## 2025-01-28 NOTE — TELEPHONE ENCOUNTER
Pt called for update, pt states she is leaving fro [place] tomorrow. Offered pt appt , she declined due to having too much to do for trip. Please advise

## 2025-01-28 NOTE — TELEPHONE ENCOUNTER
Patient called again to see if antibiotics can be called in. She really needs them today. Please call her back.    Thanks   [id number] - [place] [address]

## 2025-03-07 ENCOUNTER — OFFICE VISIT (OUTPATIENT)
Dept: FAMILY MEDICINE CLINIC | Facility: HOSPITAL | Age: 35
End: 2025-03-07
Payer: COMMERCIAL

## 2025-03-07 VITALS
DIASTOLIC BLOOD PRESSURE: 70 MMHG | HEART RATE: 78 BPM | BODY MASS INDEX: 21.34 KG/M2 | SYSTOLIC BLOOD PRESSURE: 120 MMHG | OXYGEN SATURATION: 99 % | HEIGHT: 64 IN | WEIGHT: 125 LBS

## 2025-03-07 DIAGNOSIS — M54.31 RIGHT SCIATIC NERVE PAIN: Primary | ICD-10-CM

## 2025-03-07 DIAGNOSIS — M62.830 LUMBAR PARASPINAL MUSCLE SPASM: ICD-10-CM

## 2025-03-07 PROCEDURE — 99214 OFFICE O/P EST MOD 30 MIN: CPT | Performed by: INTERNAL MEDICINE

## 2025-03-07 RX ORDER — CYCLOBENZAPRINE HCL 5 MG
5 TABLET ORAL 3 TIMES DAILY PRN
Qty: 30 TABLET | Refills: 0 | Status: SHIPPED | OUTPATIENT
Start: 2025-03-07

## 2025-03-07 RX ORDER — METHYLPREDNISOLONE 4 MG/1
TABLET ORAL
Qty: 21 EACH | Refills: 0 | Status: SHIPPED | OUTPATIENT
Start: 2025-03-07

## 2025-03-07 NOTE — PROGRESS NOTES
Assessment/Plan:     Diagnosis ICD-10-CM Associated Orders   1. Right sciatic nerve pain  M54.31 methylPREDNISolone 4 MG tablet therapy pack     cyclobenzaprine (FLEXERIL) 5 mg tablet     Ambulatory Referral to [facility]      2. Lumbar paraspinal muscle spasm  M62.830 cyclobenzaprine (FLEXERIL) 5 mg tablet     Ambulatory Referral to [facility]          Problem List Items Addressed This Visit    None  Visit Diagnoses       Right sciatic nerve pain    -  Primary    Relevant Medications    methylPREDNISolone 4 MG tablet therapy pack    cyclobenzaprine (FLEXERIL) 5 mg tablet    Other Relevant Orders    Ambulatory Referral to [facility]      Lumbar paraspinal muscle spasm        Relevant Medications    cyclobenzaprine (FLEXERIL) 5 mg tablet    Other Relevant Orders    Ambulatory Referral to [facility]            Return in about 4 weeks (around 4/4/2025) for Annual Medicare Wellness.      Subjective:    Patient ID: Lali Montoya is a 34 y.o. female    Has had back pain about 4 months that was worsened with sitting for longer periods of time--then had marked  worsening in past 3 weeks after moving couch and lifted off rug and felt like something moved in back.   Seen by PT over past 2 weeks but no improvement and having right calf cramping    Had sciatic pain when pregnant with son 8 years ago- now is first flare of any y symptoms and much worse. Now having cramping  Was trying ibuprofen but no improvement   Will be leaving on Wednesday for  family wedding in [place]- disucssed using heat pack and  stretching.  Was ill with sinus issues last month- to have ct of sinuses upcoming    Back Pain  Pertinent negatives include no dysuria.       The following portions of the patient's history were reviewed and updated as appropriate: allergies, current medications and problem list.     Review of Systems   Constitutional:  Positive for activity change. Negative for fatigue.   HENT:   "Positive for congestion.    Gastrointestinal:  Negative for constipation and diarrhea.   Genitourinary:  Negative for difficulty urinating and dysuria.   Musculoskeletal:  Positive for back pain.   All other systems reviewed and are negative.        Objective:      Current Outpatient Medications:   •  cetirizine (ZyrTEC) 10 mg tablet, Take 10 mg by mouth, Disp: , Rfl:   •  cyclobenzaprine (FLEXERIL) 5 mg tablet, Take 1 tablet (5 mg total) by mouth 3 (three) times a day as needed for muscle spasms, Disp: 30 tablet, Rfl: 0  •  methylPREDNISolone 4 MG tablet therapy pack, Use as directed on package, Disp: 21 each, Rfl: 0    Blood pressure 120/70, pulse 78, height 5' 3.5\" (1.613 m), weight 56.7 kg (125 lb), SpO2 99%, unknown if currently breastfeeding.     Physical Exam  Vitals and nursing note reviewed.   Constitutional:       General: She is in acute distress.      Comments: Standing during appt- uncomfortable sitting   HENT:      Head: Normocephalic.      Nose: No congestion.   Pulmonary:      Effort: No respiratory distress.   Abdominal:      General: Abdomen is flat. There is no distension.      Palpations: Abdomen is soft.   Musculoskeletal:         General: Tenderness present.      Cervical back: Normal range of motion. No tenderness.      Right lower leg: No edema.      Left lower leg: No edema.      Comments: Tender over right sciatic notch- has increased pain with straight leg raising right greater than left .   Reflex plus 4 on right plus 3 on left   No sensory changes   Lymphadenopathy:      Cervical: No cervical adenopathy.   Skin:     Findings: No erythema or rash.   Psychiatric:         Mood and Affect: Mood normal.         Thought Content: Thought content normal.        "

## 2025-03-19 ENCOUNTER — OFFICE VISIT (OUTPATIENT)
Dept: FAMILY MEDICINE CLINIC | Facility: HOSPITAL | Age: 35
End: 2025-03-19
Payer: COMMERCIAL

## 2025-03-19 ENCOUNTER — HOSPITAL ENCOUNTER (OUTPATIENT)
Dept: CT IMAGING | Facility: HOSPITAL | Age: 35
Discharge: HOME | End: 2025-03-19
Attending: OTOLARYNGOLOGY
Payer: COMMERCIAL

## 2025-03-19 VITALS
HEART RATE: 94 BPM | WEIGHT: 128 LBS | BODY MASS INDEX: 22.32 KG/M2 | TEMPERATURE: 98.3 F | OXYGEN SATURATION: 98 % | DIASTOLIC BLOOD PRESSURE: 80 MMHG | SYSTOLIC BLOOD PRESSURE: 128 MMHG

## 2025-03-19 DIAGNOSIS — J01.41 ACUTE RECURRENT PANSINUSITIS: ICD-10-CM

## 2025-03-19 DIAGNOSIS — L74.3 MILIARIA: Primary | ICD-10-CM

## 2025-03-19 PROCEDURE — 70486 CT MAXILLOFACIAL W/O DYE: CPT

## 2025-03-19 PROCEDURE — 99213 OFFICE O/P EST LOW 20 MIN: CPT | Performed by: NURSE PRACTITIONER

## 2025-03-19 RX ORDER — PREDNISONE 20 MG/1
20 TABLET ORAL 2 TIMES DAILY WITH MEALS
Qty: 10 TABLET | Refills: 0 | Status: SHIPPED | OUTPATIENT
Start: 2025-03-19 | End: 2025-03-24

## 2025-03-19 NOTE — ASSESSMENT & PLAN NOTE
Hx recurrent miliaria.  Recent travel to [place] for a wedding with use of mineral Suncreen.  Developed very pruritic maculopapular rash on BUE, bilateral inner thighs as well as neck/chest.  So pruritic it can feel painful at times-no open areas.  Chills without fever, no change in appetite.  Feeling otherwise well.  No one else with similar rash. Used benadryl but only made her tired.  Using OTC hydrocortisone without improvement.  Was recently on medrol dose pack for LBP which she finished last week.    -NAD however mildly uncomfortable.  Afebrile, VSS.  Maculopapular rash noted on BUE upper arms, bilateral inner thighs as well as chest/neck.  No open areas.    -advised to use ice packs, calamine lotion.  Wear loose clothing and avoid warm climates.  Consider adding pepcid to zyrtec over the next few weeks.  As hydrocortisone cream is not helping will provide short course of prednisone to calm irritation, pruritus.    -consider allergy testing as she has never had formal testing in the past; hx Mast cell disease.

## 2025-03-19 NOTE — PROGRESS NOTES
[facility]   Amy DALY    Assessment/Plan:   1. Miliaria  Assessment & Plan:  Hx recurrent miliaria.  Recent travel to [place] for a wedding with use of mineral Suncreen.  Developed very pruritic maculopapular rash on BUE, bilateral inner thighs as well as neck/chest.  So pruritic it can feel painful at times-no open areas.  Chills without fever, no change in appetite.  Feeling otherwise well.  No one else with similar rash. Used benadryl but only made her tired.  Using OTC hydrocortisone without improvement.  Was recently on medrol dose pack for LBP which she finished last week.    -NAD however mildly uncomfortable.  Afebrile, VSS.  Maculopapular rash noted on BUE upper arms, bilateral inner thighs as well as chest/neck.  No open areas.    -advised to use ice packs, calamine lotion.  Wear loose clothing and avoid warm climates.  Consider adding pepcid to zyrtec over the next few weeks.  As hydrocortisone cream is not helping will provide short course of prednisone to calm irritation, pruritus.    -consider allergy testing as she has never had formal testing in the past; hx Mast cell disease.   Orders:  -     predniSONE 20 mg tablet; Take 1 tablet (20 mg total) by mouth 2 (two) times a day with meals for 5 days  -     Ambulatory Referral to Allergy; Future      No follow-ups on file.  Patient may call or return to office with any questions or concerns.     ______________________________________________________________________  Subjective:     Patient ID: Lali Montoya is a 34 y.o. female.  Lali Montoya  Chief Complaint   Patient presents with    Rash     Itchy rash on body started while in [place] 03/15.      HPI         The following portions of the patient's history were reviewed and updated as appropriate: allergies, current medications, past family history, past medical history, past social history, past surgical history, and problem list.    Review of Systems   Constitutional:  Positive  for chills. Negative for activity change, appetite change, fatigue and fever.   HENT: Negative.  Negative for congestion, ear pain, postnasal drip and sinus pain.    Eyes: Negative.    Respiratory: Negative.  Negative for cough and shortness of breath.    Cardiovascular: Negative.  Negative for chest pain and leg swelling.   Gastrointestinal: Negative.  Negative for constipation and diarrhea.   Endocrine: Negative.    Genitourinary: Negative.  Negative for dysuria.   Musculoskeletal: Negative.    Skin:  Positive for rash.   Allergic/Immunologic: Negative.  Negative for immunocompromised state.   Neurological: Negative.  Negative for dizziness and light-headedness.   Hematological: Negative.    Psychiatric/Behavioral: Negative.           Objective:      Vitals:    03/19/25 1054   BP: 128/80   Pulse: 94   Temp: 98.3 °F (36.8 °C)   SpO2: 98%      Physical Exam  Vitals and nursing note reviewed.   Constitutional:       Appearance: Normal appearance.   HENT:      Head: Normocephalic and atraumatic.      Right Ear: Tympanic membrane, ear canal and external ear normal.      Left Ear: Tympanic membrane, ear canal and external ear normal.      Nose: Nose normal.      Mouth/Throat:      Mouth: Mucous membranes are moist.      Pharynx: Oropharynx is clear.   Eyes:      Extraocular Movements: Extraocular movements intact.      Conjunctiva/sclera: Conjunctivae normal.      Pupils: Pupils are equal, round, and reactive to light.   Cardiovascular:      Rate and Rhythm: Normal rate and regular rhythm.      Pulses: Normal pulses.      Heart sounds: Normal heart sounds.   Pulmonary:      Effort: Pulmonary effort is normal.      Breath sounds: Normal breath sounds.   Abdominal:      General: Bowel sounds are normal.      Palpations: Abdomen is soft.   Musculoskeletal:         General: Normal range of motion.      Cervical back: Normal range of motion and neck supple.   Skin:     General: Skin is warm and dry.      Findings: Rash  "present. Rash is macular and papular. Rash is not crusting or pustular.   Neurological:      General: No focal deficit present.      Mental Status: She is alert and oriented to person, place, and time.   Psychiatric:         Mood and Affect: Mood normal.         Behavior: Behavior normal.         Thought Content: Thought content normal.         Judgment: Judgment normal.           Portions of the record may have been created with voice recognition software. Occasional wrong word or \"sound alike\" substitutions may have occurred due to the inherent limitations of voice recognition software. Please review the chart carefully and recognize, using context, where substitutions/typographical errors may have occurred.       "

## 2025-03-19 NOTE — PATIENT INSTRUCTIONS
Use of calamine lotion and cool compresses.  Fragrance free soaps/lotions/perfume.   Loose clothing  Add Pepcid to zyrtec for at least the next few 1-2 weeks.   Prednisone as directed.